# Patient Record
Sex: MALE | Race: BLACK OR AFRICAN AMERICAN | Employment: FULL TIME | URBAN - METROPOLITAN AREA
[De-identification: names, ages, dates, MRNs, and addresses within clinical notes are randomized per-mention and may not be internally consistent; named-entity substitution may affect disease eponyms.]

---

## 2017-10-01 ENCOUNTER — HOSPITAL ENCOUNTER (EMERGENCY)
Age: 47
Discharge: HOME | End: 2017-10-01
Attending: FAMILY MEDICINE

## 2017-10-01 VITALS
SYSTOLIC BLOOD PRESSURE: 181 MMHG | OXYGEN SATURATION: 99 % | RESPIRATION RATE: 18 BRPM | HEART RATE: 79 BPM | DIASTOLIC BLOOD PRESSURE: 93 MMHG | HEIGHT: 70 IN | BODY MASS INDEX: 39.65 KG/M2 | TEMPERATURE: 98.9 F | WEIGHT: 277 LBS

## 2017-10-01 DIAGNOSIS — R19.7 DIARRHEA, UNSPECIFIED TYPE: ICD-10-CM

## 2017-10-01 DIAGNOSIS — R11.0 NAUSEA WITHOUT VOMITING: ICD-10-CM

## 2017-10-01 DIAGNOSIS — K52.9 GASTROENTERITIS, ACUTE: Primary | ICD-10-CM

## 2017-10-01 RX ORDER — ALBUTEROL SULFATE 90 UG/1
AEROSOL, METERED RESPIRATORY (INHALATION)
COMMUNITY

## 2017-10-01 RX ORDER — ONDANSETRON 4 MG/1
4 TABLET, ORALLY DISINTEGRATING ORAL
Qty: 6 TAB | Refills: 0 | Status: SHIPPED | OUTPATIENT
Start: 2017-10-01 | End: 2017-10-03

## 2017-10-01 NOTE — LETTER
NOTIFICATION RETURN TO WORK / SCHOOL 
 
10/1/2017 8:13 PM 
 
Mr. Lisette Lewis [address] [facility] [id number] To Whom It May Concern: 
 
Lisette Lewis is currently under the care of [address] [place] [address]. He will return to work on: 10/03/2017 If there are questions or concerns please have the patient contact our office. Sincerely, Bambi Alvarado NP

## 2017-10-02 NOTE — UC PROVIDER NOTE
HPI Comments:     He thinks he \"got a stomach flu from co-worker\" who has had diarrhea. Nausea onset yesterday lasted throughout the day. Today woke up and had diarrhea x 5 with general \"stomach upset\" denies abdominal pain or bloating. No blood or pus in diarrhea. Has not vomited. Is keeping down fluids. Denies: fever or chills. Patient is a 52 y.o. male presenting with diarrhea. Diarrhea    Associated symptoms include diarrhea. Pertinent negatives include no dysuria and no hematuria. Past Medical History:   Diagnosis Date    Asthma     Hypertension         History reviewed. No pertinent surgical history. History reviewed. No pertinent family history. Social History     Social History    Marital status: SINGLE     Spouse name: N/A    Number of children: N/A    Years of education: N/A     Occupational History    Not on file. Social History Main Topics    Smoking status: Current Some Day Smoker    Smokeless tobacco: Never Used    Alcohol use Not on file    Drug use: Not on file    Sexual activity: Not on file     Other Topics Concern    Not on file     Social History Narrative    No narrative on file                ALLERGIES: Review of patient's allergies indicates no known allergies. Review of Systems   Gastrointestinal: Positive for diarrhea. Genitourinary: Negative for difficulty urinating, dysuria, flank pain and hematuria. Vitals:    10/01/17 1950 10/01/17 1953   BP:  (!) 181/93   Pulse: 79    Resp: 18    Temp: 98.9 °F (37.2 °C)    SpO2: 99%    Weight: 125.6 kg (277 lb)    Height: 5' 9.5\" (1.765 m)        Physical Exam   Constitutional: He is oriented to person, place, and time. He appears well-developed. No distress. HENT:   Mouth/Throat: Oropharynx is clear and moist.   Cardiovascular: Normal rate, regular rhythm, normal heart sounds and intact distal pulses. Exam reveals no gallop and no friction rub. No murmur heard.   Pulmonary/Chest: Effort normal and breath sounds normal. No respiratory distress. He has no wheezes. He exhibits no tenderness. Abdominal: Soft. He exhibits no distension and no mass. Bowel sounds are increased. There is no tenderness. There is no rebound and no guarding. Non focal general abdominal tenderness: described as mild. Neurological: He is alert and oriented to person, place, and time. Skin: Skin is warm and dry. Psychiatric: He has a normal mood and affect. His behavior is normal. Judgment and thought content normal.       MDM     Differential Diagnosis; Clinical Impression; Plan:       CLINICAL IMPRESSION:  (K52.9) Gastroenteritis, acute  (primary encounter diagnosis)  (R11.0) Nausea without vomiting  (R19.7) Diarrhea, unspecified type    Orders Placed This Encounter        ondansetron (ZOFRAN ODT) 4 mg disintegrating tablet    Plan:  1. Good hydration. Afebrile with stable VS. Zofran for nausea. Easy to digest diet. 2. Re evaluation if symptoms continues past next 48 hours or for new or worsening. Risk of Significant Complications, Morbidity, and/or Mortality:   Presenting problems: Moderate  Diagnostic procedures: Moderate  Management options:   Moderate  Progress:   Patient progress:  Stable      Procedures

## 2017-10-02 NOTE — DISCHARGE INSTRUCTIONS
Gastroenteritis: Care Instructions  Your Care Instructions  Gastroenteritis is an illness that may cause nausea, vomiting, and diarrhea. It is sometimes called \"stomach flu. \" It can be caused by bacteria or a virus. You will probably begin to feel better in 1 to 2 days. In the meantime, get plenty of rest and make sure you do not become dehydrated. Dehydration occurs when your body loses too much fluid. Follow-up care is a key part of your treatment and safety. Be sure to make and go to all appointments, and call your doctor if you are having problems. Its also a good idea to know your test results and keep a list of the medicines you take. How can you care for yourself at home? · If your doctor prescribed antibiotics, take them as directed. Do not stop taking them just because you feel better. You need to take the full course of antibiotics. · Drink plenty of fluids to prevent dehydration, enough so that your urine is light yellow or clear like water. Choose water and other caffeine-free clear liquids until you feel better. If you have kidney, heart, or liver disease and have to limit fluids, talk with your doctor before you increase your fluid intake. · Drink fluids slowly, in frequent, small amounts, because drinking too much too fast can cause vomiting. · Begin eating mild foods, such as dry toast, yogurt, applesauce, bananas, and rice. Avoid spicy, hot, or high-fat foods, and do not drink alcohol or caffeine for a day or two. Do not drink milk or eat ice cream until you are feeling better. How to prevent gastroenteritis  · Keep hot foods hot and cold foods cold. · Do not eat meats, dressings, salads, or other foods that have been kept at room temperature for more than 2 hours. · Use a thermometer to check your refrigerator. It should be between 34°F and 40°F.  · Defrost meats in the refrigerator or microwave, not on the kitchen counter. · Keep your hands and your kitchen clean.  Wash your hands, cutting boards, and countertops with hot soapy water frequently. · Cook meat until it is well done. · Do not eat raw eggs or uncooked sauces made with raw eggs. · Do not take chances. If food looks or tastes spoiled, throw it out. When should you call for help? Call 911 anytime you think you may need emergency care. For example, call if:  · You vomit blood or what looks like coffee grounds. · You passed out (lost consciousness). · You pass maroon or very bloody stools. Call your doctor now or seek immediate medical care if:  · You have severe belly pain. · You have signs of needing more fluids. You have sunken eyes, a dry mouth, and pass only a little dark urine. · You feel like you are going to faint. · You have increased belly pain that does not go away in 1 to 2 days. · You have new or increased nausea, or you are vomiting. · You have a new or higher fever. · Your stools are black and tarlike or have streaks of blood. Watch closely for changes in your health, and be sure to contact your doctor if:  · You are dizzy or lightheaded. · You urinate less than usual, or your urine is dark yellow or brown. · You do not feel better with each day that goes by. Where can you learn more? Go to http://himanshu-ash.info/. Enter N142 in the search box to learn more about \"Gastroenteritis: Care Instructions. \"  Current as of: March 3, 2017  Content Version: 11.3  © 5897-6505 [employer]. Care instructions adapted under license by [employer] (which disclaims liability or warranty for this information). If you have questions about a medical condition or this instruction, always ask your healthcare professional. [address] any warranty or liability for your use of this information.

## 2024-05-17 ENCOUNTER — OFFICE VISIT (OUTPATIENT)
Age: 54
End: 2024-05-17

## 2024-05-17 VITALS
RESPIRATION RATE: 16 BRPM | DIASTOLIC BLOOD PRESSURE: 108 MMHG | SYSTOLIC BLOOD PRESSURE: 146 MMHG | WEIGHT: 268 LBS | TEMPERATURE: 99 F | HEART RATE: 81 BPM

## 2024-05-17 DIAGNOSIS — I10 PRIMARY HYPERTENSION: ICD-10-CM

## 2024-05-17 DIAGNOSIS — M10.442 OTHER SECONDARY ACUTE GOUT OF LEFT HAND: ICD-10-CM

## 2024-05-17 DIAGNOSIS — M10.9 ACUTE GOUT OF LEFT HAND, UNSPECIFIED CAUSE: Primary | ICD-10-CM

## 2024-05-17 RX ORDER — COLCHICINE 0.6 MG/1
TABLET ORAL
Qty: 20 TABLET | Refills: 0 | Status: SHIPPED | OUTPATIENT
Start: 2024-05-17

## 2024-05-17 RX ORDER — INDOMETHACIN 50 MG/1
50 CAPSULE ORAL 3 TIMES DAILY PRN
Qty: 60 CAPSULE | Refills: 0 | Status: SHIPPED | OUTPATIENT
Start: 2024-05-17

## 2024-05-17 RX ORDER — ALLOPURINOL 100 MG/1
100 TABLET ORAL DAILY
Qty: 30 TABLET | Refills: 0 | Status: SHIPPED | OUTPATIENT
Start: 2024-05-17

## 2024-05-17 NOTE — PROGRESS NOTES
Subjective: (As above and below)     The patient/guardian gave verbal consent to treat.        No chief complaint on file.      Dipak Altman Jr. is a 53 y.o. male who presents for evaluation of :  left middle finger gout attack happens more than often now. No compromise on eating habit, admits eating high purine diet, seafood, canned food, red meat, beer drinking. Naproxen not helping, requesting to be on daily medication to avoid gout attacks.   HPI      Review of Systems   Musculoskeletal:  Positive for joint swelling.       Review of Systems - negative except as listed above    Reviewed PmHx, RxHx, FmHx, SocHx, AllgHx and updated in chart.  History reviewed. No pertinent family history.  History reviewed. No pertinent past medical history.   Social History     Socioeconomic History    Marital status: Single     Spouse name: None    Number of children: None    Years of education: None    Highest education level: None   Tobacco Use    Smoking status: Some Days     Types: Cigars          Current Outpatient Medications   Medication Sig    colchicine (COLCRYS) 0.6 MG tablet Take 2 tab at the onset of attack, may repeat 1 tablet after an hour. No more than 3 tablets in 24hrs. Do not take it for more than 3 days.    allopurinol (ZYLOPRIM) 100 MG tablet Take 1 tablet by mouth daily    indomethacin (INDOCIN) 50 MG capsule Take 1 capsule by mouth 3 times daily as needed for Pain     No current facility-administered medications for this visit.       Objective:     Vitals:    05/17/24 1018   BP: (!) 146/108   Site: Left Upper Arm   Pulse: 81   Resp: 16   Temp: 99 °F (37.2 °C)   Weight: 121.6 kg (268 lb)       Physical Exam  Constitutional:       Appearance: Normal appearance.   Cardiovascular:      Rate and Rhythm: Regular rhythm.      Heart sounds: Normal heart sounds.   Pulmonary:      Effort: Pulmonary effort is normal.      Breath sounds: Normal breath sounds.   Musculoskeletal:         General: Swelling and